# Patient Record
(demographics unavailable — no encounter records)

---

## 2019-10-05 NOTE — RAD
AP CHEST:

10/5/19

 

HISTORY: 

Dyspnea. 

 

Lungs appear clear. Heart size is mildly prominent with postop sternotomy change. Vascular markings n
ormal. 

 

IMPRESSION: 

No acute abnormality. 

 

POS: OFF

## 2019-10-05 NOTE — RAD
Portable chest:



HISTORY: Assess central line placement



COMPARISON:  Earlier today at 6:12 PM



FINDINGS: Lung fields are clear. Heart and mediastinum appear unremarkable. Postop sternotomy changes
 again noted. Vascularity is normal.

 Visualized osseous structures unremarkable.

Central line via the left jugular has tip overlying the upper SVC region.



IMPRESSION: No acute finding



Reported By: Bahman Maldonado 

Electronically Signed:  10/5/2019 8:00 PM

## 2019-10-06 NOTE — PDOC.HOSPP
- Subjective


Subjective: 





Patient is minimally verbal and severely hypophonic.  Cannot effectively 

communicate.  





- Objective


Vital Signs & Weight: 


 Vital Signs (12 hours)











  Temp Pulse Ox


 


 10/06/19 12:00  98.1 F  100


 


 10/06/19 08:00  99.1 F  100


 


 10/06/19 07:02   100


 


 10/06/19 05:00  97.6 F 








 Weight











Weight                         163 lb 12.855 oz











 Most Recent Monitor Data











Heart Rate from ECG            91


 


NIBP                           155/90


 


NIBP BP-Mean                   111


 


Respiration from ECG           19


 


SpO2                           100














I&O: 


 











 10/05/19 10/06/19 10/07/19





 06:59 06:59 06:59


 


Intake Total   20


 


Output Total  660 420


 


Balance  -660 -400











Result Diagrams: 


 10/06/19 04:30





 10/06/19 04:30


Additional Labs: 


 Accuchecks











  10/06/19 10/05/19





  11:19 18:39


 


POC Glucose  147 H  400 H














Hospitalist ROS





- Medication


Medications: 


Active Medications











Generic Name Dose Route Start Last Admin





  Trade Name Rojelioq  PRN Reason Stop Dose Admin


 


Acetaminophen  650 mg  10/05/19 21:02  10/06/19 05:08





  Tylenol  PO   650 mg





  Q4H PRN   Administration





  Headache/Fever/Mild Pain (1-3)   





     





     





     


 


Diphenhydramine HCl  50 mg  10/05/19 21:05  10/06/19 06:05





  Benadryl  IVP   50 mg





  Q6H PRN   Administration





  rash   





     





     





     


 


Enoxaparin Sodium  40 mg  10/06/19 09:00  10/06/19 08:10





  Lovenox  SC   40 mg





  0900 SORAYA   Administration





     





     





     





     


 


Famotidine  20 mg  10/06/19 09:00  10/06/19 08:10





  Pepcid  SLOW IVP   20 mg





  BID SORAYA   Administration





     





     





     





     


 


Novolog Flexpen  0 each  10/06/19 09:00  10/06/19 12:48





  SC   Not Given





  QID SORAYA   





     





     





     





     














- Exam


General Appearance: NAD, awake alert


General - other findings: Chronically debilitated


ENT: normocephalic atraumatic


Neck: supple, symmetric, no JVD, no thyromegaly, no lymphadenopathy, no carotid 

bruit


Heart: RRR, no murmur, no gallops


Respiratory: CTAB, no wheezes, no rales, no ronchi, normal chest expansion


Gastrointestinal: soft, non-tender, non-distended, normal bowel sounds, no 

palpable masses, no hepatomegaly, no splenomegaly, no bruit


Gastrointestinal - other findings: PEG


Extremities: no edema


Neurological - other findings: Legs immobile.  UE's contracted, but have some 

movement.  


Musculoskeletal: generalized weakness





Hosp A/P


(1) Anoxic brain damage syndrome


Status: Acute   





(2) Anaphylactic reaction


Code(s): T78.2XXA - ANAPHYLACTIC SHOCK, UNSPECIFIED, INITIAL ENCOUNTER   Status

: Acute   





(3) Acute respiratory failure with hypoxia


Code(s): J96.01 - ACUTE RESPIRATORY FAILURE WITH HYPOXIA   Status: Acute   





- Plan





This patient came from [facility].  


I don't have all of the history, but apparently had a respiratory event leading 

to anoxic brain injury, LE paralysis and incomplete UE paralysis.  


Has PEG tube and per nurse the feeding regimen is Glucerna at 60 cc/hr x 20 

hours per day.


Will resume feeds.


No evidence of persistent allergic reaction.


Medically stable for discharge, but family has decided they want to take him 

home.


Consulted CM.  He will be total care with PEG.  They will need HH and possibly 

some DME.  


Will transfer to the Medical floor.  


Will get PT, OT consults to assist with determining home needs.

## 2019-10-07 NOTE — PDOC.HOSPP
- Subjective


Subjective: 





Doing well overall.  Non-communicative.  





- Objective


Vital Signs & Weight: 


 Vital Signs (12 hours)











  Temp Pulse Resp BP BP Pulse Ox


 


 10/07/19 08:44     161/70 H  


 


 10/07/19 08:43   89   161/70 H  


 


 10/07/19 07:32  99.1 F  89  18   161/70 H  92 L








 Weight











Admit Weight                   163 lb 12.855 oz


 


Weight                         163 lb 12.855 oz











 Most Recent Monitor Data











Heart Rate from ECG            87


 


NIBP                           179/92


 


NIBP BP-Mean                   121


 


Respiration from ECG           27


 


SpO2                           100














I&O: 


 











 10/06/19 10/07/19 10/08/19





 06:59 06:59 06:59


 


Intake Total  1114 80


 


Output Total 660 420 


 


Balance -660 694 80











Result Diagrams: 


 10/06/19 04:30





 10/06/19 04:30


Additional Labs: 


 Accuchecks











  10/07/19 10/07/19 10/07/19





  11:24 10:35 08:56


 


POC Glucose  228 H  250 H  287 H














  10/06/19





  20:56


 


POC Glucose  224 H














Hospitalist ROS





- Medication


Medications: 


Active Medications











Generic Name Dose Route Start Last Admin





  Trade Name Freq  PRN Reason Stop Dose Admin


 


Acetaminophen  650 mg  10/05/19 21:02  10/06/19 05:08





  Tylenol  PO   650 mg





  Q4H PRN   Administration





  Headache/Fever/Mild Pain (1-3)   





     





     





     


 


Alogliptin Benzoate  25 mg  10/07/19 09:00  10/07/19 08:43





  Alogliptin  PO   25 mg





  DAILY SORAYA   Administration





     





     





     





     


 


Aspirin  81 mg  10/06/19 21:00  10/07/19 08:44





  Ecotrin  PO   81 mg





  BID SORAYA   Administration





     





     





     





     


 


Atorvastatin Calcium  40 mg  10/07/19 09:00  10/07/19 08:44





  Lipitor  PO   40 mg





  DAILY SORAYA   Administration





     





     





     





     


 


Carvedilol  6.25 mg  10/06/19 21:00  10/07/19 08:44





  Coreg  PO   6.25 mg





  BID SORAYA   Administration





     





     





     





     


 


Diphenhydramine HCl  50 mg  10/05/19 21:05  10/06/19 06:05





  Benadryl  IVP   50 mg





  Q6H PRN   Administration





  rash   





     





     





     


 


Enoxaparin Sodium  40 mg  10/06/19 09:00  10/07/19 08:45





  Lovenox  SC   40 mg





  0900 SORAYA   Administration





     





     





     





     


 


Famotidine  20 mg  10/06/19 09:00  10/07/19 08:44





  Pepcid  SLOW IVP   20 mg





  BID SORAYA   Administration





     





     





     





     


 


Fluoxetine HCl  20 mg  10/07/19 09:00  10/07/19 08:44





  Prozac  PO   20 mg





  DAILY SORAYA   Administration





     





     





     





     


 


Insulin Glargine 50 units/  0.5 mls @ 0 mls/hr  10/06/19 21:00  10/06/19 21:29





  Miscellaneous Medication  SC   0.5 mls





  HS SORAYA   Administration





     





     





     





     


 


Insulin Glargine 50 units/  0.5 mls @ 0 mls/hr  10/07/19 09:00  10/07/19 08:45





  Miscellaneous Medication  SC   0.5 mls





  QAM SORAYA   Administration





     





     





     





     


 


Lisinopril  5 mg  10/06/19 21:00  10/07/19 08:43





  Zestril  PO   5 mg





  BID SORAYA   Administration





     





     





     





     


 


Novolog Flexpen  0 each  10/06/19 09:00  10/07/19 12:40





  SC   1 each





  QID SORAYA   Administration





     





     





     





     














- Exam


General Appearance: NAD, awake alert


Heart: RRR, no murmur, no gallops, no rubs, normal peripheral pulses


Respiratory: CTAB, no wheezes, no rales, no ronchi, normal chest expansion, no 

tachypnea, normal percussion


Gastrointestinal: soft, non-tender, non-distended, normal bowel sounds, no 

palpable masses, no hepatomegaly, no splenomegaly, no bruit


Gastrointestinal - other findings: PEG


Extremities: no cyanosis, no clubbing, no edema


Neurological - other findings: Incomplete quadriplegia.  Can move UE's a 

little. 


Musculoskeletal: diffuse muscle atrophy


Psychiatric: not oriented


Psychiatric - other findings: Dementia.  Non-communicative.  Does not follow 

commands. 





Hosp A/P


(1) Anoxic brain damage syndrome


Status: Acute   





(2) Anaphylactic reaction


Code(s): T78.2XXA - ANAPHYLACTIC SHOCK, UNSPECIFIED, INITIAL ENCOUNTER   Status

: Acute   





(3) Acute respiratory failure with hypoxia


Code(s): J96.01 - ACUTE RESPIRATORY FAILURE WITH HYPOXIA   Status: Acute   





(4) Quadriplegia


Code(s): G82.50 - QUADRIPLEGIA, UNSPECIFIED   Status: Acute   





(5) Dementia


Code(s): F03.90 - UNSPECIFIED DEMENTIA WITHOUT BEHAVIORAL DISTURBANCE   Status: 

Acute   





(6) Diabetes mellitus


Code(s): E11.9 - TYPE 2 DIABETES MELLITUS WITHOUT COMPLICATIONS   Status: Acute

   





- Plan





This patient came from [facility].  


Respiratory event leading to anoxic brain injury, incomplete quadriplegia.  


Has PEG tube.  Appreciate Dietician recommendations.


No evidence of persistent allergic reaction.


Medically stable for discharge, but family has decided they want to take him 

home.


Consulted CM.  He will be total care with PEG.  They will need HH and possibly 

some DME.    


Will get PT, OT consults to assist with determining home needs. 


Lond discussion with the patient's family.  They are committed to taking him 

home and caring for him.  Discussed the concerns for decubiti, pneumonia/

aspiration and UTI over time.  


Will do what we can to help them be successful.

## 2019-10-07 NOTE — HP
PRIMARY CARE DOCTOR:  None reported.



CODE STATUS:  Full code.



TIME OF EVALUATION:  8:50 p.m.



CHIEF COMPLAINT:  Shortness of breath.



HISTORY OF PRESENT ILLNESS:  This is a 65-year-old male patient with past 
medical

history of diabetes, allergies to Humalog, early stage of dementia, came to the

hospital after being in the nursing home.  The patient was given Humalog.  The

patient was allergic to Humalog.  The patient developed severe anaphylactic 
reaction

with universal distribution of a rash and hives, shortness of breath and stridor

with hypotension.  The patient responded to initial approach, treated with

epinephrine that needed to be given twice.  He also was treated with Benadryl.  
The

symptoms were severe, triggered by the allergies to Humalog and alleviated with

epinephrine. 



REVIEW OF SYSTEMS:  Unable to obtain.  The patient is nonverbal.



PAST MEDICAL HISTORY:  Includes diabetes, allergies to Humalog, hyperlipidemia,

hypertension, and early stage of dementia. 



PAST SURGICAL HISTORY:  Cardiac cath and CABG in 2016.



PSYCHIATRIC HISTORY:  Depression.



SOCIAL HISTORY:  The patient used to drink, 5 drinks per day.  Smokes cigarettes

daily, 5 cigarettes a day. 



FAMILY HISTORY:  Reviewed and noncontributor7 _________.



KNOWN ALLERGIES:  To Humalog.  The only medication he can take for his 
treatment is

NovoLog or Levemir as stated by daughter.  Also allergies to penicillin. 



REPORTED MEDICATIONS:  Januvia, fluoxetine, lisinopril, carvedilol, atorvastatin
,

aspirin, NovoLog, Levemir. 



PHYSICAL EXAMINATION:

VITAL SIGNS:  On presentation, blood pressure is 94/65, heart rate 101, 
respiratory

rate was 32, oxygen saturation 99% on room air.  Nonrebreather during treatment.

Occasionally, blood pressure 102-120, likely due to the stress in need to give

epinephrine to correct hypotension from anaphylaxis. 

GENERAL APPEARANCE:  The patient is alert, in mild respiratory distress, but 
much

improved when compared to arrival time.  The patient is in nonrebreather mask. 

HEENT: Eyes, normal conjunctivae.  Dry oral mucosa.  Anicteric.  No JVD. 

RESPIRATORY:  Bilateral air entry.  No rales.  No wheezing.  Symmetric 
expansion.

No stridor. 

CARDIOVASCULAR:  The patient is tachycardic.  Regular rhythm.  No murmurs.  No

gallop.  No edema. 

ABDOMEN: Soft.  Normal bowel sounds. 

MUSCULOSKELETAL:  Baseline range of motion and strength. 

SKIN:  Warm and intact.  No pallor.  No rash.  No redness.  Capillary refill 
seems

to be intact. 

NEURO:  The patient is nonverbal.  The patient has a history of sequela of 
having

this problem since previous admission while he was in rehabilitation.  
Communicates

mildly through a few words. 

PSYCH:  Unable to fully explore.



DIAGNOSTIC STUDIES:  EKG, the patient has normal sinus rhythm with a rate of 
89.  No

findings in favor of acute ischemic disease.  Chest x-ray was done, which 
showed no

acute abnormalities.  Repeat x-ray showed no acute findings.  Central line via 
left

jugular has tip overlying the upper SVC region. 



LABORATORY DATA:  Reviewed.  The patient has white count 10.8, hemoglobin 12.3, 
MCV

88.5, platelet count 331.  Blood gas was done, the patient has a pH of 7.41, 
pCO2

41.6, PO2 86.4.  Chemistry; sodium 138, potassium 4.7, chloride 99, carbon 
dioxide

27, anion gap 17, BUN 36, creatinine 1.35, GFR 58, glucose 469, calcium 9.7,

phosphorus 4.9, magnesium 2.0, total bilirubin 0.7, AST 17, ALT 19, alkaline

phosphatase 171.  Troponin 0.03.  Total protein 6.5, albumin 3.6, globulin 2.9.

Urine was done and showed glucosuria, no white cells.  Beta hydroxybutyrate was

negative. 



ASSESSMENT AND PLAN:  The patient will be placed in the hospital with following

medical problems: 

1. Anaphylactic reaction with shock.  The patient has severe reaction to Humalog
,

has recovered after having epinephrine given twice and Benadryl.  By the time 
of my

examination, the patient is still tachycardic.  However, pressure was recovered.

The patient is getting some fluids.  We will continue Benadryl p.r.n.  We will 
not

give steroids at this point since the patient is hyperglycemic and we will give

Pepcid.  We will place in ICU.  If further reaction appears, we will repeat 
doses of

epinephrine. 

2. Acute hypoxic respiratory failure secondary to anaphylactic reaction.  The

patient has bilateral wheezing.  Saturation dropped early presentation.  By the 
time

of my examination, these parameters have improved.  We will try to wean him off 
from

the non-rebreather mask. 

3. Uncontrolled diabetes with glucose 469.  We will reconcile home medications.
  The

patient is able to take only NovoLog and Levemir as per daughter's report.

family to bring the patient's home medications to be able to be given as 
inpatient. 

4. Deep venous thrombosis prophylaxis.

5. Hypertension, that was also initially uncontrolled due to anaphylactic 
reaction

went from hypotension  to blood pressure in the 200s due to medication effect-
Epinephrine.  We will

reconcile home medications and adjust treatment as needed.  We will not treat

aggressively since the patient has high risk still for hypotension due to half 
life

of Humalog. 

6. History of coronary artery disease.  This is a chronic problem, seems to be

stable.  Reconcile home medications. 

7. Hyperlipidemia.  The patient is on atorvastatin.  We will continue for now. 
  

8. __________.







Job ID:  [id number]



[id number]

## 2019-10-08 NOTE — PDOC.HOSPP
- Subjective


Subjective: 





Doing ok.  Family has decided that they want to pursue SNF rather than trying 

to take him home.  





- Objective


Vital Signs & Weight: 


 Vital Signs (12 hours)











  Temp Pulse Resp BP BP Pulse Ox


 


 10/08/19 08:41   89   163/80 H  


 


 10/08/19 08:40     163/80 H  


 


 10/08/19 08:00       95


 


 10/08/19 07:43  98.7 F  89  18   163/80 H  95








 Weight











Admit Weight                   163 lb 12.855 oz


 


Weight                         163 lb 12.855 oz











 Most Recent Monitor Data











Heart Rate from ECG            87


 


NIBP                           179/92


 


NIBP BP-Mean                   121


 


Respiration from ECG           27


 


SpO2                           100














I&O: 


 











 10/07/19 10/08/19 10/09/19





 06:59 06:59 06:59


 


Intake Total 1114 1535 


 


Output Total 420  


 


Balance 694 1535 











Result Diagrams: 


 10/08/19 04:01





 10/08/19 04:01


Additional Labs: 


 Accuchecks











  10/08/19 10/08/19 10/07/19





  11:15 05:10 20:29


 


POC Glucose  161 H  190 H  149 H














  10/07/19 10/07/19





  15:52 04:55


 


POC Glucose  183 H  284 H














Hospitalist ROS





- Medication


Medications: 


Active Medications











Generic Name Dose Route Start Last Admin





  Trade Name Freq  PRN Reason Stop Dose Admin


 


Acetaminophen  650 mg  10/05/19 21:02  10/07/19 20:37





  Tylenol  PO   650 mg





  Q4H PRN   Administration





  Headache/Fever/Mild Pain (1-3)   





     





     





     


 


Alogliptin Benzoate  25 mg  10/07/19 09:00  10/08/19 08:40





  Alogliptin  PO   25 mg





  DAILY SORAYA   Administration





     





     





     





     


 


Aspirin  81 mg  10/06/19 21:00  10/08/19 08:40





  Ecotrin  PO   81 mg





  BID SORAYA   Administration





     





     





     





     


 


Atorvastatin Calcium  40 mg  10/07/19 09:00  10/08/19 08:40





  Lipitor  PO   40 mg





  DAILY SORAYA   Administration





     





     





     





     


 


Carvedilol  6.25 mg  10/06/19 21:00  10/08/19 08:40





  Coreg  PO   6.25 mg





  BID SORAYA   Administration





     





     





     





     


 


Diphenhydramine HCl  50 mg  10/05/19 21:05  10/06/19 06:05





  Benadryl  IVP   50 mg





  Q6H PRN   Administration





  rash   





     





     





     


 


Enoxaparin Sodium  40 mg  10/06/19 09:00  10/08/19 08:42





  Lovenox  SC   40 mg





  0900 SORAYA   Administration





     





     





     





     


 


Famotidine  20 mg  10/06/19 09:00  10/08/19 08:42





  Pepcid  SLOW IVP   20 mg





  BID SORAYA   Administration





     





     





     





     


 


Fluoxetine HCl  20 mg  10/07/19 09:00  10/08/19 08:41





  Prozac  PO   20 mg





  DAILY SORAYA   Administration





     





     





     





     


 


Insulin Glargine 50 units/  0.5 mls @ 0 mls/hr  10/06/19 21:00  10/07/19 20:39





  Miscellaneous Medication  SC   0.5 mls





  HS SORAYA   Administration





     





     





     





     


 


Insulin Glargine 50 units/  0.5 mls @ 0 mls/hr  10/07/19 09:00  10/08/19 08:43





  Miscellaneous Medication  SC   0.5 mls





  QAM SORAYA   Administration





     





     





     





     


 


Lisinopril  5 mg  10/06/19 21:00  10/08/19 08:41





  Zestril  PO   5 mg





  BID SORAYA   Administration





     





     





     





     


 


Novolog Flexpen  0 each  10/06/19 09:00  10/08/19 13:50





  SC   1 each





  QID SORAYA   Administration





     





     





     





     














- Exam


General Appearance: NAD, awake alert


Neck: supple, symmetric, no JVD, no thyromegaly, no lymphadenopathy, no carotid 

bruit


Heart: RRR, no murmur, no gallops, no rubs, normal peripheral pulses


Respiratory: CTAB, no wheezes, no rales, no ronchi, normal chest expansion, no 

tachypnea, normal percussion


Gastrointestinal: soft, non-tender, non-distended, normal bowel sounds, no 

palpable masses, no hepatomegaly, no splenomegaly, no bruit


Skin: normal turgor


Neurological - other findings: Quad


Musculoskeletal: generalized weakness


Psychiatric: not oriented





Hosp A/P


(1) Anoxic brain damage syndrome


Status: Acute   





(2) Anaphylactic reaction


Code(s): T78.2XXA - ANAPHYLACTIC SHOCK, UNSPECIFIED, INITIAL ENCOUNTER   Status

: Acute   





(3) Acute respiratory failure with hypoxia


Code(s): J96.01 - ACUTE RESPIRATORY FAILURE WITH HYPOXIA   Status: Acute   





(4) Quadriplegia


Code(s): G82.50 - QUADRIPLEGIA, UNSPECIFIED   Status: Acute   





(5) Dementia


Code(s): F03.90 - UNSPECIFIED DEMENTIA WITHOUT BEHAVIORAL DISTURBANCE   Status: 

Acute   





(6) Diabetes mellitus


Code(s): E11.9 - TYPE 2 DIABETES MELLITUS WITHOUT COMPLICATIONS   Status: Acute

   





- Plan





This patient came from [facility].  


Respiratory event leading to anoxic brain injury, incomplete quadriplegia.  


Has PEG tube.  Appreciate Dietician recommendations.


No evidence of persistent allergic reaction.


Medically stable for discharge, but family had decided they want to take him 

home.  Now want to pursue SNF.


Consulted CM.     


Will get PT, OT consults to determine skilled needs. .

## 2019-10-09 NOTE — PDOC.HOSPP
- Subjective


Subjective: 





Doing well per family.  Had been more alert yesterday and a little more tired 

today.  They report he has had some fever.  Query the future use of his hands. 

  Family is concerned that he is constipated.  He appears to be straining like 

he is trying to have a BM.  He apparently had two BM's yesterday that were 

normal consistency.  





- Objective


Vital Signs & Weight: 


 Vital Signs (12 hours)











  Temp Pulse Resp BP BP Pulse Ox


 


 10/09/19 15:45  97.6 F  78  20   165/81 H  93 L


 


 10/09/19 11:55  98.0 F  78  18   147/78 H  95


 


 10/09/19 08:51     194/98 H  


 


 10/09/19 08:46   91   194/98 H  


 


 10/09/19 08:00  99.5 F  91  22 H   194/98 H  95








 Weight











Admit Weight                   163 lb 12.855 oz


 


Weight                         163 lb 12.855 oz











 Most Recent Monitor Data











Heart Rate from ECG            87


 


NIBP                           179/92


 


NIBP BP-Mean                   121


 


Respiration from ECG           27


 


SpO2                           100














I&O: 


 











 10/08/19 10/09/19 10/10/19





 06:59 06:59 06:59


 


Intake Total 1535  


 


Balance 1535  











Result Diagrams: 


 10/09/19 08:12





 10/08/19 04:01


Additional Labs: 


 Accuchecks











  10/09/19 10/09/19 10/09/19





  11:15 09:01 04:19


 


POC Glucose  246 H  267 H  172 H














  10/08/19 10/08/19





  19:48 15:53


 


POC Glucose  92  116 H














Hospitalist ROS





- Medication


Medications: 


Active Medications











Generic Name Dose Route Start Last Admin





  Trade Name Favio  PRN Reason Stop Dose Admin


 


Acetaminophen  650 mg  10/05/19 21:02  10/09/19 08:44





  Tylenol  PO   650 mg





  Q4H PRN   Administration





  Headache/Fever/Mild Pain (1-3)   





     





     





     


 


Alogliptin Benzoate  25 mg  10/07/19 09:00  10/09/19 08:46





  Alogliptin  PO   25 mg





  DAILY SORAYA   Administration





     





     





     





     


 


Aspirin  81 mg  10/06/19 21:00  10/09/19 08:45





  Ecotrin  PO   81 mg





  BID SORAYA   Administration





     





     





     





     


 


Atorvastatin Calcium  40 mg  10/07/19 09:00  10/09/19 08:47





  Lipitor  PO   40 mg





  DAILY SORAYA   Administration





     





     





     





     


 


Carvedilol  12.5 mg  10/09/19 09:00  10/09/19 08:51





  Coreg  PO   12.5 mg





  BID SORAYA   Administration





     





     





     





     


 


Diphenhydramine HCl  50 mg  10/05/19 21:05  10/06/19 06:05





  Benadryl  IVP   50 mg





  Q6H PRN   Administration





  rash   





     





     





     


 


Enoxaparin Sodium  40 mg  10/06/19 09:00  10/09/19 08:51





  Lovenox  SC   40 mg





  0900 SORAYA   Administration





     





     





     





     


 


Famotidine  20 mg  10/06/19 09:00  10/09/19 08:48





  Pepcid  SLOW IVP   20 mg





  BID SORAYA   Administration





     





     





     





     


 


Fluoxetine HCl  20 mg  10/07/19 09:00  10/09/19 08:48





  Prozac  PO   20 mg





  DAILY SORAYA   Administration





     





     





     





     


 


Insulin Glargine 50 units/  0.5 mls @ 0 mls/hr  10/07/19 09:00  10/09/19 08:58





  Miscellaneous Medication  SC   0.5 mls





  QAM SORAYA   Administration





     





     





     





     


 


Lisinopril  5 mg  10/06/19 21:00  10/09/19 08:46





  Zestril  PO   5 mg





  BID SORAYA   Administration





     





     





     





     


 


Novolog Flexpen  0 each  10/06/19 09:00  10/09/19 12:19





  SC   1 each





  QID SORAYA   Administration





     





     





     





     














- Exam


General Appearance: NAD, awake alert


Heart: RRR, no murmur, no gallops, no rubs, normal peripheral pulses


Respiratory: CTAB, no wheezes, no rales, no ronchi, normal chest expansion, no 

tachypnea, normal percussion


Gastrointestinal: soft, non-tender, non-distended, normal bowel sounds, no 

palpable masses, no hepatomegaly, no splenomegaly, no bruit


Gastrointestinal - other findings: PEG


Skin: normal turgor


Musculoskeletal - other findings: Incomplete quad


Psychiatric: normal affect, normal behavior, A&O x 3





Hosp A/P


(1) Anoxic brain damage syndrome


Status: Acute   





(2) Anaphylactic reaction


Code(s): T78.2XXA - ANAPHYLACTIC SHOCK, UNSPECIFIED, INITIAL ENCOUNTER   Status

: Acute   





(3) Acute respiratory failure with hypoxia


Code(s): J96.01 - ACUTE RESPIRATORY FAILURE WITH HYPOXIA   Status: Acute   





(4) Quadriplegia


Code(s): G82.50 - QUADRIPLEGIA, UNSPECIFIED   Status: Acute   





(5) Dementia


Code(s): F03.90 - UNSPECIFIED DEMENTIA WITHOUT BEHAVIORAL DISTURBANCE   Status: 

Acute   





(6) Diabetes mellitus


Code(s): E11.9 - TYPE 2 DIABETES MELLITUS WITHOUT COMPLICATIONS   Status: Acute

   





- Plan





Respiratory event leading to anoxic brain injury, incomplete quadriplegia.  


Has PEG tube.  Appreciate Dietician recommendations.  Implementing 

recommendation.  


No evidence of persistent allergic reaction.


Medically stable for discharge, but family had decided they want to take him 

home.  Now want to pursue SNF.


Consulted CM.     


Will get PT, OT consults to determine skilled needs. . 


Increasing Coreg for BP control.


Increasing Lantus at HS.  Blood sugars are not well controlled and may be a 

little higher with increasing feeds.  


Will add a PRN for constipation, but I am concerned he may have some loosening 

of the stools with increased feeds.

## 2019-10-12 NOTE — EKG
Test Reason : 

Blood Pressure : ***/*** mmHG

Vent. Rate : 089 BPM     Atrial Rate : 089 BPM

   P-R Int : 158 ms          QRS Dur : 082 ms

    QT Int : 366 ms       P-R-T Axes : 066 -05 022 degrees

   QTc Int : 445 ms

 

Normal sinus rhythm

Inferior infarct , age undetermined

No STEMI

Abnormal ECG

 

Confirmed by HILARIO MOELLER, VALENTINE ([id number]),  JOSH COE ([id number]) on 10/12/2019 11:07:27 PM

 

Referred By:             Confirmed By:VALENTINE RICH M.D.